# Patient Record
Sex: FEMALE
[De-identification: names, ages, dates, MRNs, and addresses within clinical notes are randomized per-mention and may not be internally consistent; named-entity substitution may affect disease eponyms.]

---

## 2024-08-06 PROBLEM — Z00.00 ENCOUNTER FOR PREVENTIVE HEALTH EXAMINATION: Status: ACTIVE | Noted: 2024-08-06

## 2024-09-10 ENCOUNTER — APPOINTMENT (OUTPATIENT)
Dept: SURGERY | Facility: CLINIC | Age: 64
End: 2024-09-10
Payer: COMMERCIAL

## 2024-09-10 ENCOUNTER — LABORATORY RESULT (OUTPATIENT)
Age: 64
End: 2024-09-10

## 2024-09-10 DIAGNOSIS — I10 ESSENTIAL (PRIMARY) HYPERTENSION: ICD-10-CM

## 2024-09-10 DIAGNOSIS — K21.9 GASTRO-ESOPHAGEAL REFLUX DISEASE W/OUT ESOPHAGITIS: ICD-10-CM

## 2024-09-10 DIAGNOSIS — K46.9 UNSPECIFIED ABDOMINAL HERNIA W/OUT OBSTRUCTION OR GANGRENE: ICD-10-CM

## 2024-09-10 DIAGNOSIS — E78.00 PURE HYPERCHOLESTEROLEMIA, UNSPECIFIED: ICD-10-CM

## 2024-09-10 DIAGNOSIS — J40 BRONCHITIS, NOT SPECIFIED AS ACUTE OR CHRONIC: ICD-10-CM

## 2024-09-10 DIAGNOSIS — K44.9 DIAPHRAGMATIC HERNIA W/OUT OBSTRUCTION OR GANGRENE: ICD-10-CM

## 2024-09-10 PROCEDURE — 99204 OFFICE O/P NEW MOD 45 MIN: CPT

## 2024-09-10 RX ORDER — SUCRALFATE 1 G/1
1 TABLET ORAL
Refills: 0 | Status: ACTIVE | COMMUNITY

## 2024-09-10 RX ORDER — VALSARTAN AND HYDROCHLOROTHIAZIDE 80; 12.5 MG/1; MG/1
80-12.5 TABLET, FILM COATED ORAL
Refills: 0 | Status: ACTIVE | COMMUNITY

## 2024-09-10 RX ORDER — LISDEXAMFETAMINE DIMESYLATE 10 MG/1
10 CAPSULE ORAL
Refills: 0 | Status: ACTIVE | COMMUNITY

## 2024-09-10 RX ORDER — PRAVASTATIN SODIUM 40 MG/1
40 TABLET ORAL
Refills: 0 | Status: ACTIVE | COMMUNITY

## 2024-09-11 LAB
25(OH)D3 SERPL-MCNC: 36 NG/ML
ALBUMIN SERPL ELPH-MCNC: 4.4 G/DL
ALP BLD-CCNC: 123 U/L
ALT SERPL-CCNC: 22 U/L
ANION GAP SERPL CALC-SCNC: 12 MMOL/L
APPEARANCE: CLEAR
APTT BLD: 32.9 SEC
AST SERPL-CCNC: 21 U/L
BACTERIA: ABNORMAL /HPF
BILIRUB SERPL-MCNC: 0.4 MG/DL
BILIRUBIN URINE: NEGATIVE
BLOOD URINE: NEGATIVE
BUN SERPL-MCNC: 12 MG/DL
CALCIUM SERPL-MCNC: 9.6 MG/DL
CALCIUM SERPL-MCNC: 9.6 MG/DL
CAST: 0 /LPF
CHLORIDE SERPL-SCNC: 102 MMOL/L
CHOLEST SERPL-MCNC: 214 MG/DL
CO2 SERPL-SCNC: 26 MMOL/L
COLOR: YELLOW
CREAT SERPL-MCNC: 0.84 MG/DL
EGFR: 77 ML/MIN/1.73M2
EPITHELIAL CELLS: 3 /HPF
ESTIMATED AVERAGE GLUCOSE: 128 MG/DL
FOLATE SERPL-MCNC: >20 NG/ML
GLUCOSE QUALITATIVE U: NEGATIVE MG/DL
GLUCOSE SERPL-MCNC: 101 MG/DL
HBA1C MFR BLD HPLC: 6.1 %
HCT VFR BLD CALC: 40.3 %
HDLC SERPL-MCNC: 69 MG/DL
HGB BLD-MCNC: 13 G/DL
INR PPP: 0.99 RATIO
IRON SATN MFR SERPL: 32 %
IRON SERPL-MCNC: 133 UG/DL
KETONES URINE: NEGATIVE MG/DL
LDLC SERPL CALC-MCNC: 125 MG/DL
LEUKOCYTE ESTERASE URINE: NEGATIVE
MAGNESIUM SERPL-MCNC: 2.2 MG/DL
MCHC RBC-ENTMCNC: 30.4 PG
MCHC RBC-ENTMCNC: 32.3 GM/DL
MCV RBC AUTO: 94.4 FL
MICROSCOPIC-UA: NORMAL
NITRITE URINE: NEGATIVE
NONHDLC SERPL-MCNC: 145 MG/DL
PARATHYROID HORMONE INTACT: 47 PG/ML
PH URINE: 7.5
PHOSPHATE SERPL-MCNC: 3.1 MG/DL
PLATELET # BLD AUTO: 378 K/UL
POTASSIUM SERPL-SCNC: 4.1 MMOL/L
PREALB SERPL NEPH-MCNC: 31 MG/DL
PROT SERPL-MCNC: 6.9 G/DL
PROTEIN URINE: NEGATIVE MG/DL
PT BLD: 11.3 SEC
RBC # BLD: 4.27 M/UL
RBC # FLD: 15.2 %
RED BLOOD CELLS URINE: 1 /HPF
SODIUM SERPL-SCNC: 139 MMOL/L
SPECIFIC GRAVITY URINE: 1.01
TIBC SERPL-MCNC: 421 UG/DL
TRIGL SERPL-MCNC: 113 MG/DL
TSH SERPL-ACNC: 1.86 UIU/ML
UIBC SERPL-MCNC: 288 UG/DL
UROBILINOGEN URINE: 0.2 MG/DL
VIT B12 SERPL-MCNC: 784 PG/ML
WBC # FLD AUTO: 10.29 K/UL
WHITE BLOOD CELLS URINE: 1 /HPF

## 2024-09-11 NOTE — HISTORY OF PRESENT ILLNESS
[de-identified] : Ms. Machuca is a 64 year old female PMHx DM, HTN, GERD, Florian's esophagus and PSHx meningioma removal (2023) and gastric bypass in 2015 (prior to surgery 275 lbs with loss [id number] after surgery and now 203 lbs), presents for complaints of persistent reflux symptoms that has been mildly relieved to pantoprazole and omeprazole and carafate. She is able to swallow well and does not have any vomiting or nausea with oral intake. Denies aspiration, chest pain, SOB. Patient states can walk / climb 3 flights of stairs without stopping or symptomatic MOCTEZUMA. Denies hx of bleeding conditions. Prior GI Workup:  -EGD 08/02/2024: Large hiatal hernia present, GE junction at 32. Grade B esophagitis with active oozing of blood. (images uploaded)  -UGI images: Pending results (patient will send)  - Swallow study: patient will send results -EGD 0605/2024: With Grade B esophagitis with no bleeding -Upper EGD 06/30/2022: Gastric bypass with a normal-sized pouch 7cm long. GJ anastomosis with healthy appearing mucosa -Esphogram 4/15/2022: Small hiatal hernia, luminal narrowing of stomach at level of diaphragmatic hiatus

## 2024-09-11 NOTE — HISTORY OF PRESENT ILLNESS
[de-identified] : Ms. Machuca is a 64 year old female PMHx DM, HTN, GERD, Florian's esophagus and PSHx meningioma removal (2023) and gastric bypass in 2015 (prior to surgery 275 lbs with loss [id number] after surgery and now 203 lbs), presents for complaints of persistent reflux symptoms that has been mildly relieved to pantoprazole and omeprazole and carafate. She is able to swallow well and does not have any vomiting or nausea with oral intake. Denies aspiration, chest pain, SOB. Patient states can walk / climb 3 flights of stairs without stopping or symptomatic MOCTEZUMA. Denies hx of bleeding conditions. Prior GI Workup:  -EGD 08/02/2024: Large hiatal hernia present, GE junction at 32. Grade B esophagitis with active oozing of blood. (images uploaded)  -UGI images: Pending results (patient will send)  - Swallow study: patient will send results -EGD 0605/2024: With Grade B esophagitis with no bleeding -Upper EGD 06/30/2022: Gastric bypass with a normal-sized pouch 7cm long. GJ anastomosis with healthy appearing mucosa -Esphogram 4/15/2022: Small hiatal hernia, luminal narrowing of stomach at level of diaphragmatic hiatus

## 2024-09-11 NOTE — ASSESSMENT
[FreeTextEntry1] : 64 year old female PMHx DM, HTN, GERD, Florian's esophagus and PSHx meningioma removal (2023) and gastric bypass in 2015 (prior to surgery 275 lbs with loss [id number] after surgery and now 203 lbs), presents for complaints of persistent reflux symptoms that has been mildly relieved to pantoprazole and omeprazole and carafate. She is able to swallow well and does not have any vomiting or nausea with oral intake. Patient states can walk / climb 3 flights of stairs without stopping or symptomatic MOCTEZUMA. Denies hx of bleeding conditions. Prior GI Workup:  -EGD 08/02/2024: Large hiatal hernia present, GE junction at 32. Grade B esophagitis with active oozing of blood. (images uploaded)  -UGI images: Pending results (patient will send)  - Swallow study: patient will send results -EGD 0605/2024: With Grade B esophagitis with no bleeding -Upper EGD 06/30/2022: Gastric bypass with a normal-sized ouch 7cm long. GJ anastomosis with healthy appearing mucosa -Esophagram 4/15/2022: Small hiatal hernia, luminal narrowing of stomach at level of diaphragmatic hiatus  Elective surgical options for repair were discussed with the patient at length. The risks, benefits, and alternatives of the types of repair (such as hiatal hernia repair and LINX and fundoplication) as well as to the use of mesh were discussed and all questions answered. Risks of hernia repair include, but are not limited to infection, bleeding, recurrence and injury to adjacent structures (esophagus, stomach, or spleen) and nerves. Patient will proceed with pre-operative clearance for hiatal hernia.   Plan: Laparoscopic repair of hiatal hernia with partial fundoplication, possible LINX, possible small bowel resection

## 2024-09-11 NOTE — ASSESSMENT
[FreeTextEntry1] : 64 year old female PMHx DM, HTN, GERD, Florian's esophagus and PSHx meningioma removal (2023) and gastric bypass in 2015 (prior to surgery 275 lbs with loss [id number] after surgery and now 203 lbs), presents for complaints of persistent reflux symptoms that has been mildly relieved to pantoprazole and omeprazole and carafate. She is able to swallow well and does not have any vomiting or nausea with oral intake. Patient states can walk / climb 3 flights of stairs without stopping or symptomatic MOCTEZUMA. Denies hx of bleeding conditions. Prior GI Workup:  -EGD 08/02/2024: Large hiatal hernia present, GE junction at 32. Grade B esophagitis with active oozing of blood. (images uploaded)  -UGI images: Pending results (patient will send)  - Swallow study: patient will send results -EGD 0605/2024: With Grade B esophagitis with no bleeding -Upper EGD 06/30/2022: Gastric bypass with a normal-sized ouch 7cm long. GJ anastomosis with healthy appearing mucosa -Esophagram 4/15/2022: Small hiatal hernia, luminal narrowing of stomach at level of diaphragmatic hiatus  Elective surgical options for repair were discussed with the patient at length. The risks, benefits, and alternatives of the types of repair (such as hiatal hernia repair and LINX and fundoplication) as well as to the use of mesh were discussed and all questions answered. Risks of hernia repair include, but are not limited to infection, bleeding, recurrence and injury to adjacent structures (esophagus, stomach, or spleen) and nerves. Patient will proceed with pre-operative clearance for hiatal hernia.   Plan: Laparoscopic repair of hiatal hernia with partial fundoplication, possible LINX, possible small bowel resection

## 2024-09-11 NOTE — PLAN
[FreeTextEntry1] : Persistent GERD to PPI/Hiatal hernia s/p gastric bypass - F/u UGI imaging and swallow study  -Medical clearance for bariatric surgery -F/u pre-op labs  -F/u pre-op EKG - Plan: Laparoscopic repair of hiatal hernia with partial fundoplication, possible LINX, possible small bowel resection

## 2024-09-12 LAB — CA-I SERPL-SCNC: 5.1 MG/DL

## 2024-09-13 ENCOUNTER — TRANSCRIPTION ENCOUNTER (OUTPATIENT)
Age: 64
End: 2024-09-13

## 2024-09-13 LAB
24R-OH-CALCIDIOL SERPL-MCNC: 43.6 PG/ML
ZINC SERPL-MCNC: 70 UG/DL

## 2024-09-14 LAB
MENADIONE SERPL-MCNC: 0.41 NG/ML
VIT C SERPL-MCNC: 0.6 MG/DL

## 2024-09-15 LAB — VIT B1 SERPL-MCNC: 130.6 NMOL/L

## 2024-09-16 LAB — VIT B2 SERPL-MCNC: 320 UG/L

## 2024-09-18 LAB
VIT A SERPL-MCNC: 66.1 UG/DL
VIT B6 SERPL-MCNC: 10.9 UG/L

## 2024-09-24 ENCOUNTER — APPOINTMENT (OUTPATIENT)
Dept: BARIATRICS | Facility: CLINIC | Age: 64
End: 2024-09-24
Payer: COMMERCIAL

## 2024-09-24 VITALS
DIASTOLIC BLOOD PRESSURE: 88 MMHG | BODY MASS INDEX: 33.9 KG/M2 | SYSTOLIC BLOOD PRESSURE: 128 MMHG | TEMPERATURE: 97.8 F | HEIGHT: 64.5 IN | HEART RATE: 82 BPM | WEIGHT: 201 LBS

## 2024-09-24 DIAGNOSIS — K21.9 GASTRO-ESOPHAGEAL REFLUX DISEASE W/OUT ESOPHAGITIS: ICD-10-CM

## 2024-09-24 DIAGNOSIS — K44.9 DIAPHRAGMATIC HERNIA W/OUT OBSTRUCTION OR GANGRENE: ICD-10-CM

## 2024-09-24 PROCEDURE — 99214 OFFICE O/P EST MOD 30 MIN: CPT

## 2024-09-24 NOTE — ASSESSMENT
[FreeTextEntry1] : 64 year old female PMHx DM, HTN, GERD, Anna's esophagus and PSHx meningioma removal (2023) and gastric bypass in 2017 (prior to surgery 275 lbs with loss to150 lbs after surgery and now 201 lbs), presents for complaints of persistent reflux symptoms that has been mildly relieved to pantoprazole and omeprazole and carafate. She has trouble keeping food down associated with spit up, no vomiting or aspiration. Denies aspiration, chest pain, SOB. Patient states can walk / climb 3 flights of stairs without stopping or symptomatic MOCTEZUMA. Denies hx of bleeding conditions. Patient's pre-op labs were reviewed and discussed with patient. Patient is ready to proceed with surgery.  Prior GI Workup: -EGD 08/02/2024: Large hiatal hernia present, GE junction at 32. Grade B esophagitis with active oozing of blood. (images uploaded) -UGI 08/09/2024: Large hiatal hernia of the gastric pouch pouch status post bypass, Spontaneous gastroesophageal reflux (report uploaded, images sent via link [email or web address])  -EGD 06/05/2024: With Grade B esophagitis with no bleeding -EGD 06/30/2022: Gastric bypass with a normal-sized pouch 7cm long. GJ anastomosis with healthy appearing mucosa -Esphogram 4/15/2022: Small hiatal hernia, luminal narrowing of stomach at level of diaphragmatic hiatus.  Patient ready for surgery.   Elective surgical options for repair were discussed with the patient at length. The risks, benefits, and alternatives of the types of repair (such as hiatal hernia repair and LINX and fundoplication) as well as to the use of mesh were discussed and all questions answered. Risks of hernia repair include, but are not limited to infection, bleeding, recurrence and injury to adjacent structures (esophagus, stomach, or spleen) and nerves. Patient will proceed with pre-operative clearance for hiatal hernia.  Plan: Laparoscopic repair of hiatal hernia with partial fundoplication, possible LINX, possible small bowel resection. Scheduled for October 9th

## 2024-09-24 NOTE — HISTORY OF PRESENT ILLNESS
[de-identified] : Ms. Machuca is a  64 year old female PMHx DM, HTN, GERD, Anna's esophagus and PSHx meningioma removal (2023) and gastric bypass in 2017 (prior to surgery 275 lbs with loss to150 lbs after surgery and now 201 lbs), presents for complaints of persistent reflux symptoms that has been mildly relieved to pantoprazole and omeprazole and carafate. She has trouble keeping food down associated with spit up, no vomiting or aspiration. Denies aspiration, chest pain, SOB. Patient states can walk / climb 3 flights of stairs without stopping or symptomatic MOCTEZUMA. Denies hx of bleeding conditions. Patient's pre-op labs were reviewed and discussed with patient. Patient is ready to proceed with surgery.  Prior GI Workup: -EGD 08/02/2024: Large hiatal hernia present, GE junction at 32. Grade B esophagitis with active oozing of blood. (images uploaded) -UGI 08/09/2024: Large hiatal hernia of the gastric pouch pouch status post bypass, Spontaneous gastroesophageal reflux (report uploaded, images sent via link [email or web address])  -EGD 06/05/2024: With Grade B esophagitis with no bleeding -EGD 06/30/2022: Gastric bypass with a normal-sized pouch 7cm long. GJ anastomosis with healthy appearing mucosa -Esphogram 4/15/2022: Small hiatal hernia, luminal narrowing of stomach at level of diaphragmatic hiatus.

## 2024-09-24 NOTE — PLAN
[FreeTextEntry1] : Persistent GERD to PPI/Hiatal hernia s/p gastric bypass - Medical clearance for bariatric surgery (patient has PCP appointment scheduled this week)  - Plan: Laparoscopic repair of hiatal hernia with partial fundoplication, possible LINX, possible small bowel resection on October 9th ~3 hours

## 2024-10-08 ENCOUNTER — NON-APPOINTMENT (OUTPATIENT)
Age: 64
End: 2024-10-08

## 2024-10-09 ENCOUNTER — INPATIENT (INPATIENT)
Facility: HOSPITAL | Age: 64
LOS: 0 days | Discharge: HOME | End: 2024-10-10
Attending: SURGERY | Admitting: SURGERY
Payer: COMMERCIAL

## 2024-10-09 ENCOUNTER — TRANSCRIPTION ENCOUNTER (OUTPATIENT)
Age: 64
End: 2024-10-09

## 2024-10-09 ENCOUNTER — APPOINTMENT (OUTPATIENT)
Dept: BARIATRICS | Facility: HOSPITAL | Age: 64
End: 2024-10-09

## 2024-10-09 ENCOUNTER — RESULT REVIEW (OUTPATIENT)
Age: 64
End: 2024-10-09

## 2024-10-09 VITALS
RESPIRATION RATE: 16 BRPM | WEIGHT: 199.74 LBS | HEIGHT: 64 IN | TEMPERATURE: 97 F | SYSTOLIC BLOOD PRESSURE: 137 MMHG | DIASTOLIC BLOOD PRESSURE: 83 MMHG | OXYGEN SATURATION: 100 % | HEART RATE: 67 BPM

## 2024-10-09 DIAGNOSIS — H43.819 VITREOUS DEGENERATION, UNSPECIFIED EYE: Chronic | ICD-10-CM

## 2024-10-09 DIAGNOSIS — M25.519 PAIN IN UNSPECIFIED SHOULDER: ICD-10-CM

## 2024-10-09 DIAGNOSIS — Z98.51 TUBAL LIGATION STATUS: Chronic | ICD-10-CM

## 2024-10-09 DIAGNOSIS — Z98.890 OTHER SPECIFIED POSTPROCEDURAL STATES: Chronic | ICD-10-CM

## 2024-10-09 LAB — GLUCOSE BLDC GLUCOMTR-MCNC: 109 MG/DL — HIGH (ref 70–99)

## 2024-10-09 PROCEDURE — 43281 LAP PARAESOPHAG HERN REPAIR: CPT

## 2024-10-09 PROCEDURE — 88307 TISSUE EXAM BY PATHOLOGIST: CPT | Mod: 26

## 2024-10-09 PROCEDURE — 44202 LAP ENTERECTOMY: CPT

## 2024-10-09 DEVICE — STAPLER ETHICON ECHELON ENDOPATH 60MM: Type: IMPLANTABLE DEVICE | Status: FUNCTIONAL

## 2024-10-09 DEVICE — STAPLER COVIDIEN TRI-STAPLE 60MM TAN RELOAD: Type: IMPLANTABLE DEVICE | Status: FUNCTIONAL

## 2024-10-09 RX ORDER — VALSARTAN AND HYDROCHLOROTHIAZIDE 160; 25 MG/1; MG/1
1 TABLET, FILM COATED ORAL
Refills: 0 | DISCHARGE

## 2024-10-09 RX ORDER — PANTOPRAZOLE SODIUM 40 MG/1
1 TABLET, DELAYED RELEASE ORAL
Refills: 0 | DISCHARGE

## 2024-10-09 RX ORDER — SODIUM CHLORIDE IRRIG SOLUTION 0.9 %
500 SOLUTION, IRRIGATION IRRIGATION ONCE
Refills: 0 | Status: COMPLETED | OUTPATIENT
Start: 2024-10-09 | End: 2024-10-09

## 2024-10-09 RX ORDER — ACETAMINOPHEN 325 MG
650 TABLET ORAL EVERY 6 HOURS
Refills: 0 | Status: DISCONTINUED | OUTPATIENT
Start: 2024-10-09 | End: 2024-10-10

## 2024-10-09 RX ORDER — PRAVASTATIN SODIUM 20 MG/1
1 TABLET ORAL
Refills: 0 | DISCHARGE

## 2024-10-09 RX ORDER — HYDROMORPHONE HYDROCHLORIDE 1 MG/ML
0.25 INJECTION, SOLUTION INTRAMUSCULAR; INTRAVENOUS; SUBCUTANEOUS
Refills: 0 | Status: DISCONTINUED | OUTPATIENT
Start: 2024-10-09 | End: 2024-10-09

## 2024-10-09 RX ORDER — INFLUENZA VIRUS VACCINE 15; 15; 15; 15 UG/.5ML; UG/.5ML; UG/.5ML; UG/.5ML
0.5 SUSPENSION INTRAMUSCULAR ONCE
Refills: 0 | Status: DISCONTINUED | OUTPATIENT
Start: 2024-10-09 | End: 2024-10-10

## 2024-10-09 RX ORDER — OXYCODONE HYDROCHLORIDE 30 MG/1
5 TABLET, FILM COATED, EXTENDED RELEASE ORAL ONCE
Refills: 0 | Status: DISCONTINUED | OUTPATIENT
Start: 2024-10-09 | End: 2024-10-09

## 2024-10-09 RX ORDER — LISDEXAMFETAMINE DIMESYLATE 30 MG/1
1 CAPSULE ORAL
Refills: 0 | DISCHARGE

## 2024-10-09 RX ORDER — SODIUM CHLORIDE IRRIG SOLUTION 0.9 %
1000 SOLUTION, IRRIGATION IRRIGATION
Refills: 0 | Status: DISCONTINUED | OUTPATIENT
Start: 2024-10-09 | End: 2024-10-09

## 2024-10-09 RX ORDER — ONDANSETRON HCL/PF 4 MG/2 ML
4 VIAL (ML) INJECTION EVERY 6 HOURS
Refills: 0 | Status: DISCONTINUED | OUTPATIENT
Start: 2024-10-09 | End: 2024-10-10

## 2024-10-09 RX ORDER — OXYCODONE HYDROCHLORIDE 30 MG/1
5 TABLET, FILM COATED, EXTENDED RELEASE ORAL EVERY 6 HOURS
Refills: 0 | Status: DISCONTINUED | OUTPATIENT
Start: 2024-10-09 | End: 2024-10-10

## 2024-10-09 RX ORDER — ACETAMINOPHEN 325 MG
650 TABLET ORAL ONCE
Refills: 0 | Status: DISCONTINUED | OUTPATIENT
Start: 2024-10-09 | End: 2024-10-09

## 2024-10-09 RX ORDER — OXYCODONE HYDROCHLORIDE 30 MG/1
1 TABLET, FILM COATED, EXTENDED RELEASE ORAL
Qty: 10 | Refills: 0
Start: 2024-10-09

## 2024-10-09 RX ADMIN — HYDROMORPHONE HYDROCHLORIDE 0.25 MILLIGRAM(S): 1 INJECTION, SOLUTION INTRAMUSCULAR; INTRAVENOUS; SUBCUTANEOUS at 15:16

## 2024-10-09 RX ADMIN — HYDROMORPHONE HYDROCHLORIDE 0.25 MILLIGRAM(S): 1 INJECTION, SOLUTION INTRAMUSCULAR; INTRAVENOUS; SUBCUTANEOUS at 15:37

## 2024-10-09 RX ADMIN — Medication 5000 UNIT(S): at 22:26

## 2024-10-09 RX ADMIN — HYDROMORPHONE HYDROCHLORIDE 0.25 MILLIGRAM(S): 1 INJECTION, SOLUTION INTRAMUSCULAR; INTRAVENOUS; SUBCUTANEOUS at 15:15

## 2024-10-09 RX ADMIN — Medication 1500 MILLILITER(S): at 14:32

## 2024-10-09 RX ADMIN — OXYCODONE HYDROCHLORIDE 5 MILLIGRAM(S): 30 TABLET, FILM COATED, EXTENDED RELEASE ORAL at 17:28

## 2024-10-09 RX ADMIN — Medication 88 MICROGRAM(S): at 19:19

## 2024-10-09 RX ADMIN — OXYCODONE HYDROCHLORIDE 5 MILLIGRAM(S): 30 TABLET, FILM COATED, EXTENDED RELEASE ORAL at 23:49

## 2024-10-09 RX ADMIN — OXYCODONE HYDROCHLORIDE 5 MILLIGRAM(S): 30 TABLET, FILM COATED, EXTENDED RELEASE ORAL at 15:37

## 2024-10-09 RX ADMIN — OXYCODONE HYDROCHLORIDE 5 MILLIGRAM(S): 30 TABLET, FILM COATED, EXTENDED RELEASE ORAL at 18:28

## 2024-10-09 RX ADMIN — HYDROMORPHONE HYDROCHLORIDE 0.25 MILLIGRAM(S): 1 INJECTION, SOLUTION INTRAMUSCULAR; INTRAVENOUS; SUBCUTANEOUS at 14:34

## 2024-10-09 RX ADMIN — Medication 75 MILLILITER(S): at 14:32

## 2024-10-09 RX ADMIN — Medication 5000 UNIT(S): at 16:40

## 2024-10-09 RX ADMIN — Medication 650 MILLIGRAM(S): at 21:45

## 2024-10-09 RX ADMIN — Medication 650 MILLIGRAM(S): at 20:51

## 2024-10-09 NOTE — ASU DISCHARGE PLAN (ADULT/PEDIATRIC) - NS MD DC FALL RISK RISK
For information on Fall & Injury Prevention, visit: [email or web address] OR  [email or web address] OR  https://www.cdc.gov/steadi/patient.html

## 2024-10-09 NOTE — ASU DISCHARGE PLAN (ADULT/PEDIATRIC) - ASU DC SPECIAL INSTRUCTIONSFT
Follow up with Dr. Hutson in 1-2 weeks. Call the office to schedule your appointment.     You should be on a full liquid diet for the first week after surgery and can then progress to a puree/soft diet after that.     Please resume all regular home medications unless specifically advised not to take a particular medication. Also, please take any new medications as prescribed.    To help with pain control, you can take tylenol per the instructions on the bottle. You will also have oxycodone for severe pain. Ice on the incisions for 15 minutes at a time can help as well.      Please get plenty of rest, continue to ambulate several times per day, and drink adequate amounts of fluids. Avoid lifting weights greater than 5-10 lbs until you follow-up with your surgeon, who will instruct you further regarding activity restrictions. Avoid driving or operating heavy machinery while taking pain medications. You may shower letting soap and water run over incisions. Pat dry with clean towel when finished.    Call the office if you experience increasing abdominal pain, nausea, vomiting, or temperature >101 F.

## 2024-10-09 NOTE — PRE-OP CHECKLIST - SELECT TESTS ORDERED
Upper GI Endoscopy/CBC/CMP/PT/PTT/INR/Urinalysis/EKG Upper GI Endoscopy, BS - 109/CBC/CMP/PT/PTT/INR/Urinalysis/EKG/POCT Blood Glucose

## 2024-10-09 NOTE — CHART NOTE - NSCHARTNOTEFT_GEN_A_CORE
General Surgery Post op Check    Pt seen and examined without complaints. Pain is controlled. Denies SOB/CP/N/V.     Vital Signs Last 24 Hrs  T(C): 36.2 (09 Oct 2024 13:53), Max: 36.2 (09 Oct 2024 13:53)  T(F): 97.1 (09 Oct 2024 13:53), Max: 97.1 (09 Oct 2024 13:53)  HR: 68 (09 Oct 2024 15:53) (62 - 71)  BP: 103/67 (09 Oct 2024 15:53) (85/48 - 137/83)  BP(mean): 79 (09 Oct 2024 15:53) (62 - 80)  RR: 15 (09 Oct 2024 15:53) (12 - 19)  SpO2: 93% (09 Oct 2024 15:53) (93% - 100%)    Parameters below as of 09 Oct 2024 15:53  Patient On (Oxygen Delivery Method): room air        I&O's Summary      Physical Exam  Gen: NAD, A&Ox3  Pulm: No respiratory distress, no subcostal retractions  CV: RRR  Abd: Soft, NT, ND, incisions c/d/i  Extremities:  FROM, warm and well perfused      Louise Machuca is a 64 year old female with a history of HLD, HTN, GERD c/b Barrets, s/p RNYGB with HH repair, who presents with recurrent GERD and evidence of progressive hiatal hernia on EGD. She was taken to the OR for a laparoscopic hiatal hernia repair with posterior fundoplication on 10/9.     23 hour obs  CLD/IVF  Pain and nausea control

## 2024-10-09 NOTE — ASU DISCHARGE PLAN (ADULT/PEDIATRIC) - SPECIFY DIET AND FLUID
You can have clear and full liquids for the first week after surgery. You can proceed to purees and soft foods after that.

## 2024-10-09 NOTE — ASU DISCHARGE PLAN (ADULT/PEDIATRIC) - CARE PROVIDER_API CALL
Kareem Hutson  Surgery  [address] [place] [address], [address]  [place] [address]  Phone: [phone]  Fax: [phone]  Follow Up Time:

## 2024-10-09 NOTE — BRIEF OPERATIVE NOTE - OPERATION/FINDINGS
We started by placing a veress needle in the LUQ and insufflating to establish pneumoperitoneum. We placed three other additional ports including a 15mm port in the mid abdomen. Upon entry we could see a sizeable hiatal hernia containing the gastric pouch. We took down adhesions at the hiatus between the diaphragm and the pouch as well as those between the pouch and the remnant stomach. There was an adequate amount of gastric fundus present to allow for a fundoplication later in the case. We moved onto the hiatal dissection were we carefully took down adhesions circumferentially around the esophagus, visualizing and protecting the vagus nerves throughout. This was challenging given her prior hiatal dissection but we did extend into a virgin plane to accomplish adequate esophageal mobilization. We closed the hiatal defect using Ethibond suture posteriorly and anteriorly. Next we resected a small segment of jejunum from the afferent limb since this could potentially contribute to reflux/interfere with transit of food into the jejunum. Finally, we performed a posterior 270 degree wrap with the fundus of the remnant stomach. We performed an endoscopy at this time and found that the pouch was somewhat large which again could contribute to delayed passage of food through the GJ anastomosis. We plicated this to help reduce the size and it was improved on repeat endoscopy. All ports were removed and skin was closed with monocryl and dermabond.

## 2024-10-09 NOTE — ASU DISCHARGE PLAN (ADULT/PEDIATRIC) - FINANCIAL ASSISTANCE
[facility] provides services at a reduced cost to those who are determined to be eligible through [facility]’s financial assistance program. Information regarding [facility]’s financial assistance program can be found by going to [email or web address] or by calling [phone].

## 2024-10-10 ENCOUNTER — TRANSCRIPTION ENCOUNTER (OUTPATIENT)
Age: 64
End: 2024-10-10

## 2024-10-10 VITALS
OXYGEN SATURATION: 99 % | DIASTOLIC BLOOD PRESSURE: 65 MMHG | HEART RATE: 72 BPM | SYSTOLIC BLOOD PRESSURE: 106 MMHG | TEMPERATURE: 98 F | RESPIRATION RATE: 18 BRPM

## 2024-10-10 PROCEDURE — 88307 TISSUE EXAM BY PATHOLOGIST: CPT

## 2024-10-10 PROCEDURE — C1889: CPT

## 2024-10-10 PROCEDURE — C9399: CPT

## 2024-10-10 PROCEDURE — 86850 RBC ANTIBODY SCREEN: CPT

## 2024-10-10 PROCEDURE — 86901 BLOOD TYPING SEROLOGIC RH(D): CPT

## 2024-10-10 PROCEDURE — 86900 BLOOD TYPING SEROLOGIC ABO: CPT

## 2024-10-10 PROCEDURE — 82962 GLUCOSE BLOOD TEST: CPT

## 2024-10-10 RX ORDER — KETOROLAC TROMETHAMINE 10 MG/1
30 TABLET, FILM COATED ORAL EVERY 6 HOURS
Refills: 0 | Status: DISCONTINUED | OUTPATIENT
Start: 2024-10-10 | End: 2024-10-10

## 2024-10-10 RX ADMIN — Medication 66 MICROGRAM(S): at 07:09

## 2024-10-10 RX ADMIN — KETOROLAC TROMETHAMINE 30 MILLIGRAM(S): 10 TABLET, FILM COATED ORAL at 08:32

## 2024-10-10 RX ADMIN — Medication 5000 UNIT(S): at 05:09

## 2024-10-10 RX ADMIN — Medication 650 MILLIGRAM(S): at 05:08

## 2024-10-10 RX ADMIN — OXYCODONE HYDROCHLORIDE 5 MILLIGRAM(S): 30 TABLET, FILM COATED, EXTENDED RELEASE ORAL at 00:44

## 2024-10-10 RX ADMIN — Medication 650 MILLIGRAM(S): at 06:42

## 2024-10-10 RX ADMIN — OXYCODONE HYDROCHLORIDE 5 MILLIGRAM(S): 30 TABLET, FILM COATED, EXTENDED RELEASE ORAL at 06:33

## 2024-10-10 NOTE — PROGRESS NOTE ADULT - SUBJECTIVE AND OBJECTIVE BOX
INTERVAL HPI/OVERNIGHT EVENTS: n/-v, tolerating clears, IV levothyroxine for am dose    STATUS POST: laparoscopic hiatal hernia repair with posterior fundoplication     POST OPERATIVE DAY #: 1    SUBJECTIVE:  Pt seen and examined with chief resident on morning rounds. Reports that she is feeling well. Continues to have some upper abdominal pain. Denies nausea or vomiting and is tolerating liquids well.    MEDICATIONS  (STANDING):  heparin   Injectable 5000 Unit(s) SubCutaneous every 8 hours  influenza  Vaccine (HIGH DOSE) 0.5 milliLiter(s) IntraMuscular once  ketorolac   Injectable 30 milliGRAM(s) IV Push every 6 hours  levothyroxine 88 MICROGram(s) Oral daily    MEDICATIONS  (PRN):  acetaminophen   Oral Liquid .. 650 milliGRAM(s) Oral every 6 hours PRN Mild Pain (1 - 3), Moderate Pain (4 - 6)  ondansetron Injectable 4 milliGRAM(s) IV Push every 6 hours PRN Nausea and/or Vomiting  oxyCODONE    Solution 5 milliGRAM(s) Oral every 6 hours PRN Severe Pain (7 - 10)      Vital Signs Last 24 Hrs  T(C): 36.4 (10 Oct 2024 09:11), Max: 36.8 (09 Oct 2024 16:25)  T(F): 97.5 (10 Oct 2024 09:11), Max: 98.2 (09 Oct 2024 16:25)  HR: 72 (10 Oct 2024 09:11) (62 - 80)  BP: 106/65 (10 Oct 2024 09:11) (103/67 - 125/80)  BP(mean): 79 (09 Oct 2024 15:53) (77 - 80)  RR: 18 (10 Oct 2024 09:11) (12 - 18)  SpO2: 99% (10 Oct 2024 09:11) (93% - 99%)    Parameters below as of 10 Oct 2024 09:11  Patient On (Oxygen Delivery Method): room air        Physical exam:  General Appearance: Appears well, NAD  Pulmonary: Nonlabored breathing, no respiratory distress  HEENT: NTAC  Abdomen: Soft, nondistended, appropriate epigastric tenderness to palpation. Incisions c/d/i  Extremities: WWP, SCD's in place     I&O's Detail    09 Oct 2024 07:01  -  10 Oct 2024 07:00  --------------------------------------------------------  IN:  Total IN: 0 mL    OUT:    Voided (mL): 500 mL  Total OUT: 500 mL    Total NET: -500 mL      10 Oct 2024 07:01  -  10 Oct 2024 14:41  --------------------------------------------------------  IN:    Oral Fluid: 350 mL  Total IN: 350 mL    OUT:  Total OUT: 0 mL    Total NET: 350 mL          LABS:                RADIOLOGY & ADDITIONAL STUDIES:
Looks and feels well  AVSS  pain control moderate, add Toradol 30mg  plan for d/c this am on full liquids  Oxy and Tylenol liquids for pain  questions answered and instructions reviewed

## 2024-10-10 NOTE — DISCHARGE NOTE NURSING/CASE MANAGEMENT/SOCIAL WORK - PATIENT PORTAL LINK FT
You can access the FollowMyHealth Patient Portal offered by [facility] by registering at the following website: [email or web address]. By joining [facility]’s FollowMyHealth portal, you will also be able to view your health information using other applications (apps) compatible with our system.

## 2024-10-10 NOTE — PROGRESS NOTE ADULT - ASSESSMENT
Louise Machuca is a 64 year old female with a history of HLD, HTN, GERD c/b Barrets, s/p RNYGB with HH repair, who presents with recurrent GERD and evidence of progressive hiatal hernia on EGD. She was taken to the OR for a laparoscopic hiatal hernia repair with posterior fundoplication on 10/9. Vital signs stable, tolerating diet well. Stable for discharge home with oxycodone and tylenol for pain control.

## 2024-10-15 PROBLEM — M25.519 SHOULDER PAIN: Status: ACTIVE | Noted: 2024-10-15

## 2024-10-15 RX ORDER — LIDOCAINE 40 MG/G
4 PATCH TOPICAL
Qty: 1 | Refills: 0 | Status: ACTIVE | COMMUNITY
Start: 2024-10-15 | End: 1900-01-01

## 2024-10-17 DIAGNOSIS — K22.70 BARRETT'S ESOPHAGUS WITHOUT DYSPLASIA: ICD-10-CM

## 2024-10-17 DIAGNOSIS — I10 ESSENTIAL (PRIMARY) HYPERTENSION: ICD-10-CM

## 2024-10-17 DIAGNOSIS — K44.9 DIAPHRAGMATIC HERNIA WITHOUT OBSTRUCTION OR GANGRENE: ICD-10-CM

## 2024-10-17 DIAGNOSIS — Z98.84 BARIATRIC SURGERY STATUS: ICD-10-CM

## 2024-10-17 DIAGNOSIS — K21.9 GASTRO-ESOPHAGEAL REFLUX DISEASE WITHOUT ESOPHAGITIS: ICD-10-CM

## 2024-10-17 DIAGNOSIS — E78.5 HYPERLIPIDEMIA, UNSPECIFIED: ICD-10-CM

## 2024-10-18 LAB — SURGICAL PATHOLOGY STUDY: SIGNIFICANT CHANGE UP

## 2024-10-22 ENCOUNTER — APPOINTMENT (OUTPATIENT)
Dept: BARIATRICS | Facility: CLINIC | Age: 64
End: 2024-10-22
Payer: COMMERCIAL

## 2024-10-22 VITALS
SYSTOLIC BLOOD PRESSURE: 101 MMHG | TEMPERATURE: 97.9 F | WEIGHT: 188 LBS | DIASTOLIC BLOOD PRESSURE: 67 MMHG | OXYGEN SATURATION: 97 % | HEART RATE: 84 BPM | HEIGHT: 64.5 IN | BODY MASS INDEX: 31.71 KG/M2

## 2024-10-22 DIAGNOSIS — Z98.890 OTHER SPECIFIED POSTPROCEDURAL STATES: ICD-10-CM

## 2024-10-22 DIAGNOSIS — Z87.19 OTHER SPECIFIED POSTPROCEDURAL STATES: ICD-10-CM

## 2024-10-22 PROBLEM — E78.5 HYPERLIPIDEMIA, UNSPECIFIED: Chronic | Status: ACTIVE | Noted: 2024-10-08

## 2024-10-22 PROBLEM — E11.9 TYPE 2 DIABETES MELLITUS WITHOUT COMPLICATIONS: Chronic | Status: ACTIVE | Noted: 2024-10-08

## 2024-10-22 PROBLEM — K22.70 BARRETT'S ESOPHAGUS WITHOUT DYSPLASIA: Chronic | Status: ACTIVE | Noted: 2024-10-08

## 2024-10-22 PROBLEM — Z86.69 PERSONAL HISTORY OF OTHER DISEASES OF THE NERVOUS SYSTEM AND SENSE ORGANS: Chronic | Status: ACTIVE | Noted: 2024-10-08

## 2024-10-22 PROBLEM — E03.9 HYPOTHYROIDISM, UNSPECIFIED: Chronic | Status: ACTIVE | Noted: 2024-10-08

## 2024-10-22 PROBLEM — K57.90 DIVERTICULOSIS OF INTESTINE, PART UNSPECIFIED, WITHOUT PERFORATION OR ABSCESS WITHOUT BLEEDING: Chronic | Status: ACTIVE | Noted: 2024-10-08

## 2024-10-22 PROBLEM — M34.9 SYSTEMIC SCLEROSIS, UNSPECIFIED: Chronic | Status: ACTIVE | Noted: 2024-10-08

## 2024-10-22 PROBLEM — Z86.73 PERSONAL HISTORY OF TRANSIENT ISCHEMIC ATTACK (TIA), AND CEREBRAL INFARCTION WITHOUT RESIDUAL DEFICITS: Chronic | Status: ACTIVE | Noted: 2024-10-08

## 2024-10-22 PROBLEM — K21.9 GASTRO-ESOPHAGEAL REFLUX DISEASE WITHOUT ESOPHAGITIS: Chronic | Status: ACTIVE | Noted: 2024-10-08

## 2024-10-22 PROBLEM — I25.10 ATHEROSCLEROTIC HEART DISEASE OF NATIVE CORONARY ARTERY WITHOUT ANGINA PECTORIS: Chronic | Status: ACTIVE | Noted: 2024-10-08

## 2024-10-22 PROBLEM — Z86.39 PERSONAL HISTORY OF OTHER ENDOCRINE, NUTRITIONAL AND METABOLIC DISEASE: Chronic | Status: ACTIVE | Noted: 2024-10-08

## 2024-10-22 PROBLEM — I73.00 RAYNAUD'S SYNDROME WITHOUT GANGRENE: Chronic | Status: ACTIVE | Noted: 2024-10-08

## 2024-10-22 PROBLEM — F41.9 ANXIETY DISORDER, UNSPECIFIED: Chronic | Status: ACTIVE | Noted: 2024-10-08

## 2024-10-22 PROBLEM — D32.0 BENIGN NEOPLASM OF CEREBRAL MENINGES: Chronic | Status: ACTIVE | Noted: 2024-10-08

## 2024-10-22 PROBLEM — Z86.19 PERSONAL HISTORY OF OTHER INFECTIOUS AND PARASITIC DISEASES: Chronic | Status: ACTIVE | Noted: 2024-10-08

## 2024-10-22 PROBLEM — I34.0 NONRHEUMATIC MITRAL (VALVE) INSUFFICIENCY: Chronic | Status: ACTIVE | Noted: 2024-10-08

## 2024-10-22 PROBLEM — Z87.898 PERSONAL HISTORY OF OTHER SPECIFIED CONDITIONS: Chronic | Status: ACTIVE | Noted: 2024-10-08

## 2024-10-22 PROCEDURE — 99024 POSTOP FOLLOW-UP VISIT: CPT

## 2024-10-23 PROBLEM — M54.12 RADICULOPATHY, CERVICAL REGION: Chronic | Status: ACTIVE | Noted: 2024-10-08

## 2024-10-29 PROBLEM — Z86.69 PERSONAL HISTORY OF OTHER DISEASES OF THE NERVOUS SYSTEM AND SENSE ORGANS: Chronic | Status: ACTIVE | Noted: 2024-10-08

## 2024-10-29 PROBLEM — I10 ESSENTIAL (PRIMARY) HYPERTENSION: Chronic | Status: ACTIVE | Noted: 2024-10-08

## 2024-10-29 PROBLEM — Z86.39 PERSONAL HISTORY OF OTHER ENDOCRINE, NUTRITIONAL AND METABOLIC DISEASE: Chronic | Status: ACTIVE | Noted: 2024-10-08

## 2024-10-29 PROBLEM — Z87.39 PERSONAL HISTORY OF OTHER DISEASES OF THE MUSCULOSKELETAL SYSTEM AND CONNECTIVE TISSUE: Chronic | Status: ACTIVE | Noted: 2024-10-08

## 2024-10-30 ENCOUNTER — APPOINTMENT (OUTPATIENT)
Dept: BARIATRICS | Facility: CLINIC | Age: 64
End: 2024-10-30
Payer: COMMERCIAL

## 2024-10-30 PROCEDURE — 97802 MEDICAL NUTRITION INDIV IN: CPT | Mod: 93

## 2024-11-19 ENCOUNTER — APPOINTMENT (OUTPATIENT)
Dept: SURGERY | Facility: CLINIC | Age: 64
End: 2024-11-19
Payer: COMMERCIAL

## 2024-11-19 VITALS
SYSTOLIC BLOOD PRESSURE: 125 MMHG | DIASTOLIC BLOOD PRESSURE: 80 MMHG | WEIGHT: 186 LBS | OXYGEN SATURATION: 96 % | HEIGHT: 64.5 IN | TEMPERATURE: 97.7 F | BODY MASS INDEX: 31.37 KG/M2 | HEART RATE: 71 BPM

## 2024-11-19 DIAGNOSIS — Z98.890 OTHER SPECIFIED POSTPROCEDURAL STATES: ICD-10-CM

## 2024-11-19 DIAGNOSIS — Z87.19 OTHER SPECIFIED POSTPROCEDURAL STATES: ICD-10-CM

## 2024-11-19 PROCEDURE — 99024 POSTOP FOLLOW-UP VISIT: CPT

## 2024-11-26 ENCOUNTER — APPOINTMENT (OUTPATIENT)
Dept: BARIATRICS | Facility: CLINIC | Age: 64
End: 2024-11-26
Payer: COMMERCIAL

## 2024-11-26 PROCEDURE — 98968 PH1 ASSMT&MGMT NQHP 21-30: CPT | Mod: 93

## 2024-11-26 PROCEDURE — 97802 MEDICAL NUTRITION INDIV IN: CPT | Mod: 93

## 2024-11-26 PROCEDURE — 99443: CPT | Mod: 93

## 2024-12-10 DIAGNOSIS — Z98.84 BARIATRIC SURGERY STATUS: ICD-10-CM

## 2024-12-27 ENCOUNTER — LABORATORY RESULT (OUTPATIENT)
Age: 64
End: 2024-12-27

## 2025-01-14 ENCOUNTER — APPOINTMENT (OUTPATIENT)
Dept: SURGERY | Facility: CLINIC | Age: 65
End: 2025-01-14
Payer: COMMERCIAL

## 2025-01-14 VITALS
SYSTOLIC BLOOD PRESSURE: 147 MMHG | OXYGEN SATURATION: 97 % | DIASTOLIC BLOOD PRESSURE: 83 MMHG | WEIGHT: 188 LBS | HEIGHT: 64.5 IN | HEART RATE: 77 BPM | BODY MASS INDEX: 31.71 KG/M2 | TEMPERATURE: 97.1 F

## 2025-01-14 DIAGNOSIS — Z98.890 OTHER SPECIFIED POSTPROCEDURAL STATES: ICD-10-CM

## 2025-01-14 DIAGNOSIS — Z87.19 OTHER SPECIFIED POSTPROCEDURAL STATES: ICD-10-CM

## 2025-01-14 PROCEDURE — 99213 OFFICE O/P EST LOW 20 MIN: CPT

## 2025-02-24 NOTE — PATIENT PROFILE ADULT - PATIENT REPRESENTATIVE: ( YOU CAN CHOOSE ANY PERSON THAT CAN ASSIST YOU WITH YOUR HEALTH CARE PREFERENCES, DOES NOT HAVE TO BE A SPOUSE, IMMEDIATE FAMILY OR SIGNIFICANT OTHER/PARTNER)
Sergey Parker (:  2009) is a 15 y.o. male,Established patient, here for evaluation of the following chief complaint(s):  Pharyngitis (Pt c/o sore throat, bilateral ear pain, nasal congestion x 2 days)      Assessment & Plan :  Visit Diagnoses and Associated Orders       Non-recurrent acute suppurative otitis media of right ear without spontaneous rupture of tympanic membrane    -  Primary    amoxicillin (AMOXIL) 500 MG capsule [451]           Sore throat        POCT rapid strep A [46886 Custom]                 Results for orders placed or performed in visit on 25   POCT rapid strep A   Result Value Ref Range    Strep A Ag None Detected None Detected       Differential diagnoses: strep pharyngitis, viral pharyngitis, viral URI, allergic rhinitis, covid, influenza, otitis media, tonsillar abscess     Plan: He tested negative for strep today in the office.  He appears to have an acute right otitis media.  He was prescribed amoxicillin for the infection.  He was advised to take Tylenol and/or ibuprofen for pain/fever relief and encouraged to rest and increase fluid intake.  Follow-up with primary care as needed.  Return precautions discussed.  Follow up in 3 days if symptoms persist or if symptoms worsen.       Subjective :  HPI  Sergey Parker is a 15 y.o. male who presents with complaints of a sore throat and bilateral ear pain.  He states that he started with symptoms on Friday.  He states that he initially started with the bilateral ear pain and states that on  his throat started to hurt.  He states that he has pain when swallowing.  He reports that his sister tested positive for strep earlier today.  Mom states that she has also been sick with the flu that she was diagnosed with 3 weeks ago.  He states that he has had a mild cough.  He denies any fevers, chills, or bodyaches.  He denies use of over-the-counter medications prior to arrival for symptom relief.          Vitals:    [id number] 
same name as above

## 2025-04-11 PROBLEM — R13.10 DYSPHAGIA: Status: ACTIVE | Noted: 2025-04-11

## 2025-04-11 PROBLEM — R13.10 SWALLOWING DIFFICULTY: Status: ACTIVE | Noted: 2025-04-11

## 2025-04-15 ENCOUNTER — APPOINTMENT (OUTPATIENT)
Dept: SURGERY | Facility: CLINIC | Age: 65
End: 2025-04-15

## 2025-04-15 DIAGNOSIS — Z87.19 OTHER SPECIFIED POSTPROCEDURAL STATES: ICD-10-CM

## 2025-04-15 DIAGNOSIS — Z98.84 BARIATRIC SURGERY STATUS: ICD-10-CM

## 2025-04-15 DIAGNOSIS — Z98.890 OTHER SPECIFIED POSTPROCEDURAL STATES: ICD-10-CM

## 2025-04-15 DIAGNOSIS — R13.10 DYSPHAGIA, UNSPECIFIED: ICD-10-CM

## (undated) DEVICE — INSUFFLATION NDL ETHICON ENDOPATH PNEUMOPARITONEUM 120MM

## (undated) DEVICE — BLADE SURGICAL #11 CARBON

## (undated) DEVICE — STAPLER COVIDIEN ENDO GIA XL HANDLE

## (undated) DEVICE — SOL IRR POUR H2O 250ML

## (undated) DEVICE — DRSG TEGADERM 2.5X3"

## (undated) DEVICE — DRAPE 3/4 SHEET 52X76"

## (undated) DEVICE — ELCTR BOVIE PENCIL HANDPIECE

## (undated) DEVICE — SOL IRR BAG NS 0.9% 1000ML

## (undated) DEVICE — SUT ETHIBOND 0 30" CT-1 GREEN

## (undated) DEVICE — GOWN XL LEVEL 3

## (undated) DEVICE — LIGASURE MARYLAND 44CM

## (undated) DEVICE — PLUMEPORT LAPAROSCOPIC SMOKE FILTRATION DEVICE

## (undated) DEVICE — SUT VICRYL 0 27" UR-6

## (undated) DEVICE — SUT MONOCRYL PLUS 4-0 18" PS-2 UNDYED

## (undated) DEVICE — Device

## (undated) DEVICE — GLV 6.5 PROTEXIS (WHITE)

## (undated) DEVICE — BOWL SOL 32OZ LG STRL

## (undated) DEVICE — DRSG STERISTRIPS 0.5 X 4"

## (undated) DEVICE — SUT PASSER SYMMETRY OR PRO PUNCTURE CLOSURE DEVICE

## (undated) DEVICE — BLADE SURGICAL #15 CARBON

## (undated) DEVICE — TROCAR APPLIED MEDICAL KII BALLOON BLUNT TIP 12MM X 100MM

## (undated) DEVICE — PREP CHLORAPREP HI-LITE ORANGE 26ML

## (undated) DEVICE — DRAPE GENERAL ENDOSCOPY

## (undated) DEVICE — DRSG DERMABOND 0.7ML

## (undated) DEVICE — ELCTR GROUNDING PAD ADULT COVIDIEN

## (undated) DEVICE — BLADE SURGICAL #10 CARBON

## (undated) DEVICE — VENODYNE/SCD SLEEVE CALF MEDIUM

## (undated) DEVICE — DRAPE TOWEL BLUE 17" X 24"